# Patient Record
Sex: FEMALE | Race: ASIAN | NOT HISPANIC OR LATINO | URBAN - METROPOLITAN AREA
[De-identification: names, ages, dates, MRNs, and addresses within clinical notes are randomized per-mention and may not be internally consistent; named-entity substitution may affect disease eponyms.]

---

## 2024-02-01 ENCOUNTER — OV NP (OUTPATIENT)
Dept: URBAN - METROPOLITAN AREA CLINIC 78 | Facility: CLINIC | Age: 84
End: 2024-02-01
Payer: MEDICARE

## 2024-02-01 VITALS
TEMPERATURE: 97.6 F | SYSTOLIC BLOOD PRESSURE: 171 MMHG | BODY MASS INDEX: 31.37 KG/M2 | WEIGHT: 155.6 LBS | HEART RATE: 72 BPM | DIASTOLIC BLOOD PRESSURE: 59 MMHG | HEIGHT: 59 IN

## 2024-02-01 DIAGNOSIS — R19.5 OCCULT BLOOD POSITIVE STOOL: ICD-10-CM

## 2024-02-01 DIAGNOSIS — I25.10 CAD IN NATIVE ARTERY: ICD-10-CM

## 2024-02-01 DIAGNOSIS — R12 HEARTBURN: ICD-10-CM

## 2024-02-01 PROBLEM — 53741008: Status: ACTIVE | Noted: 2024-02-01

## 2024-02-01 PROCEDURE — 99204 OFFICE O/P NEW MOD 45 MIN: CPT | Performed by: INTERNAL MEDICINE

## 2024-02-01 PROCEDURE — 99244 OFF/OP CNSLTJ NEW/EST MOD 40: CPT | Performed by: INTERNAL MEDICINE

## 2024-02-01 RX ORDER — VALSARTAN 160 MG/1
1 TABLET TABLET, FILM COATED ORAL ONCE A DAY
Status: ACTIVE | COMMUNITY

## 2024-02-01 RX ORDER — ASPIRIN 81 MG/1
1 TABLET TABLET, COATED ORAL ONCE A DAY
Status: ACTIVE | COMMUNITY

## 2024-02-01 RX ORDER — CHLORTHALIDONE 25 MG/1
HALF TAB THREE TIMES A WEEK TABLET ORAL
Status: ACTIVE | COMMUNITY

## 2024-02-01 RX ORDER — FAMOTIDINE 20 MG/1
1 TABLET AT BEDTIME TABLET, FILM COATED ORAL ONCE A DAY
Status: ACTIVE | COMMUNITY

## 2024-02-01 RX ORDER — COLCHICINE 0.6 MG/1
1 TABLET TABLET, FILM COATED ORAL
Status: ACTIVE | COMMUNITY

## 2024-02-01 RX ORDER — ATORVASTATIN CALCIUM 20 MG/1
1 TABLET TABLET, FILM COATED ORAL ONCE A DAY
Status: ACTIVE | COMMUNITY

## 2024-02-01 RX ORDER — CHLORTHALIDONE 25 MG/1
1 TABLET IN THE MORNING WITH FOOD TABLET ORAL
Status: ACTIVE | COMMUNITY

## 2024-02-01 RX ORDER — GLIPIZIDE 2.5 MG/1
1 TABLET 30 MINUTES BEFORE BREAKFAST TABLET ORAL ONCE A DAY
Status: ACTIVE | COMMUNITY

## 2024-02-01 NOTE — HPI-TODAY'S VISIT:
The patient was referred to us by Dr. Lexy Cintron for +FOBT. A copy of this note willbe sent to the referring physician.   The patient has never had either EGD or colonoscopy previously. There is no FH of esoph/gastric/colon canceror colon polyps.   Thereis no recent history of rectal bleeding or melena. The patient has no pertinentadditional complaints of abdominal pain, constipation, diarrhea, bloating,rectal pain, or unintentional weight loss.   She   Regarding any upper GI complaints, the patient has not had nausea, vomiting ordysphagia.    She has had  heartburn for years for which reason she takes Pepcid daily.   The patient does not take blood thinners.  They deny any CP or MANUEL. She has a history of CAD with stents. She jsut saw the cardiologist about 2 weeks ago. She is scheduled for an ECHO. She is not very active as she has knee arthritis and needs replacemennt. She uses a walker or wheelchair.